# Patient Record
Sex: MALE | URBAN - METROPOLITAN AREA
[De-identification: names, ages, dates, MRNs, and addresses within clinical notes are randomized per-mention and may not be internally consistent; named-entity substitution may affect disease eponyms.]

---

## 2023-12-28 ENCOUNTER — APPOINTMENT (OUTPATIENT)
Dept: MEDICAL GROUP | Facility: MEDICAL CENTER | Age: 53
End: 2023-12-28
Payer: COMMERCIAL

## 2024-08-20 ENCOUNTER — HOSPITAL ENCOUNTER (EMERGENCY)
Facility: MEDICAL CENTER | Age: 54
End: 2024-08-20
Attending: EMERGENCY MEDICINE
Payer: COMMERCIAL

## 2024-08-20 ENCOUNTER — APPOINTMENT (OUTPATIENT)
Dept: RADIOLOGY | Facility: MEDICAL CENTER | Age: 54
End: 2024-08-20
Attending: EMERGENCY MEDICINE
Payer: COMMERCIAL

## 2024-08-20 VITALS
SYSTOLIC BLOOD PRESSURE: 146 MMHG | DIASTOLIC BLOOD PRESSURE: 86 MMHG | HEART RATE: 50 BPM | OXYGEN SATURATION: 97 % | TEMPERATURE: 98.2 F | HEIGHT: 72 IN | BODY MASS INDEX: 24.38 KG/M2 | RESPIRATION RATE: 17 BRPM | WEIGHT: 180 LBS

## 2024-08-20 DIAGNOSIS — R00.2 PALPITATIONS: ICD-10-CM

## 2024-08-20 LAB
ALBUMIN SERPL BCP-MCNC: 4 G/DL (ref 3.2–4.9)
ALBUMIN/GLOB SERPL: 1.7 G/DL
ALP SERPL-CCNC: 71 U/L (ref 30–99)
ALT SERPL-CCNC: 15 U/L (ref 2–50)
ANION GAP SERPL CALC-SCNC: 12 MMOL/L (ref 7–16)
AST SERPL-CCNC: 14 U/L (ref 12–45)
BASOPHILS # BLD AUTO: 1.3 % (ref 0–1.8)
BASOPHILS # BLD: 0.07 K/UL (ref 0–0.12)
BILIRUB SERPL-MCNC: 0.6 MG/DL (ref 0.1–1.5)
BUN SERPL-MCNC: 14 MG/DL (ref 8–22)
CALCIUM ALBUM COR SERPL-MCNC: 9.1 MG/DL (ref 8.5–10.5)
CALCIUM SERPL-MCNC: 9.1 MG/DL (ref 8.5–10.5)
CHLORIDE SERPL-SCNC: 106 MMOL/L (ref 96–112)
CO2 SERPL-SCNC: 22 MMOL/L (ref 20–33)
CREAT SERPL-MCNC: 0.71 MG/DL (ref 0.5–1.4)
EKG IMPRESSION: NORMAL
EOSINOPHIL # BLD AUTO: 0.04 K/UL (ref 0–0.51)
EOSINOPHIL NFR BLD: 0.8 % (ref 0–6.9)
ERYTHROCYTE [DISTWIDTH] IN BLOOD BY AUTOMATED COUNT: 41.8 FL (ref 35.9–50)
GFR SERPLBLD CREATININE-BSD FMLA CKD-EPI: 109 ML/MIN/1.73 M 2
GLOBULIN SER CALC-MCNC: 2.4 G/DL (ref 1.9–3.5)
GLUCOSE SERPL-MCNC: 103 MG/DL (ref 65–99)
HCT VFR BLD AUTO: 44.4 % (ref 42–52)
HGB BLD-MCNC: 15.1 G/DL (ref 14–18)
IMM GRANULOCYTES # BLD AUTO: 0.01 K/UL (ref 0–0.11)
IMM GRANULOCYTES NFR BLD AUTO: 0.2 % (ref 0–0.9)
LYMPHOCYTES # BLD AUTO: 0.99 K/UL (ref 1–4.8)
LYMPHOCYTES NFR BLD: 19.1 % (ref 22–41)
MCH RBC QN AUTO: 29.8 PG (ref 27–33)
MCHC RBC AUTO-ENTMCNC: 34 G/DL (ref 32.3–36.5)
MCV RBC AUTO: 87.7 FL (ref 81.4–97.8)
MONOCYTES # BLD AUTO: 0.35 K/UL (ref 0–0.85)
MONOCYTES NFR BLD AUTO: 6.7 % (ref 0–13.4)
NEUTROPHILS # BLD AUTO: 3.73 K/UL (ref 1.82–7.42)
NEUTROPHILS NFR BLD: 71.9 % (ref 44–72)
NRBC # BLD AUTO: 0 K/UL
NRBC BLD-RTO: 0 /100 WBC (ref 0–0.2)
PLATELET # BLD AUTO: 260 K/UL (ref 164–446)
PMV BLD AUTO: 9.6 FL (ref 9–12.9)
POTASSIUM SERPL-SCNC: 4 MMOL/L (ref 3.6–5.5)
PROT SERPL-MCNC: 6.4 G/DL (ref 6–8.2)
RBC # BLD AUTO: 5.06 M/UL (ref 4.7–6.1)
SODIUM SERPL-SCNC: 140 MMOL/L (ref 135–145)
TROPONIN T SERPL-MCNC: <6 NG/L (ref 6–19)
TSH SERPL DL<=0.005 MIU/L-ACNC: 0.66 UIU/ML (ref 0.38–5.33)
WBC # BLD AUTO: 5.2 K/UL (ref 4.8–10.8)

## 2024-08-20 PROCEDURE — 84443 ASSAY THYROID STIM HORMONE: CPT

## 2024-08-20 PROCEDURE — 36415 COLL VENOUS BLD VENIPUNCTURE: CPT

## 2024-08-20 PROCEDURE — 85025 COMPLETE CBC W/AUTO DIFF WBC: CPT

## 2024-08-20 PROCEDURE — 93005 ELECTROCARDIOGRAM TRACING: CPT

## 2024-08-20 PROCEDURE — 71045 X-RAY EXAM CHEST 1 VIEW: CPT

## 2024-08-20 PROCEDURE — 93005 ELECTROCARDIOGRAM TRACING: CPT | Performed by: EMERGENCY MEDICINE

## 2024-08-20 PROCEDURE — 80053 COMPREHEN METABOLIC PANEL: CPT

## 2024-08-20 PROCEDURE — 99283 EMERGENCY DEPT VISIT LOW MDM: CPT

## 2024-08-20 PROCEDURE — 84484 ASSAY OF TROPONIN QUANT: CPT

## 2024-08-20 ASSESSMENT — LIFESTYLE VARIABLES
TOTAL SCORE: 0
HAVE PEOPLE ANNOYED YOU BY CRITICIZING YOUR DRINKING: NO
HAVE YOU EVER FELT YOU SHOULD CUT DOWN ON YOUR DRINKING: NO
TOTAL SCORE: 0
EVER HAD A DRINK FIRST THING IN THE MORNING TO STEADY YOUR NERVES TO GET RID OF A HANGOVER: NO
TOTAL SCORE: 0
CONSUMPTION TOTAL: INCOMPLETE
DO YOU DRINK ALCOHOL: NO
EVER FELT BAD OR GUILTY ABOUT YOUR DRINKING: NO

## 2024-08-20 NOTE — Clinical Note
Osmani Allen was seen and treated in our emergency department on 8/20/2024.  He may return to work on 08/22/2024.       If you have any questions or concerns, please don't hesitate to call.      Everton Stanford D.O.

## 2024-08-20 NOTE — ED TRIAGE NOTES
".  Chief Complaint   Patient presents with    Palpitations     Pt. States he has been getting woke up with his \"heart skipping a beat\" that causes him to become sob, denies n/v or pain     ./76   Pulse 75   Temp 36.8 °C (98.2 °F) (Temporal)   Resp 18   Ht 1.829 m (6')   Wt 81.6 kg (180 lb)   SpO2 97%   BMI 24.41 kg/m²       "

## 2024-08-21 NOTE — ED PROVIDER NOTES
"ED Provider Note    CHIEF COMPLAINT  Chief Complaint   Patient presents with    Palpitations     Pt. States he has been getting woke up with his \"heart skipping a beat\" that causes him to become sob, denies n/v or pain       EXTERNAL RECORDS REVIEWED  Outpatient Notes   lifelong medical care, primary care services, urgent care in [place]    HPI/ROS  LIMITATION TO HISTORY   Select: : None  OUTSIDE HISTORIAN(S):  None    Osmani Allen is a 53 y.o. male who presents here for evaluation of heart palpitations.  The patient states that he has been waking up in the mornings with heart palpitations, but has no chest pain or shortness of breath.  He states it could be from his dreams, but he is unsure.  He has no pressure or pain, no headache, and no back pain.  He does not have any crease use of alcohol or caffeine.  He states he has been trying to meditate through his issues.    PAST MEDICAL HISTORY   No bleeding disorders    SURGICAL HISTORY  patient denies any surgical history    FAMILY HISTORY  No family history on file.    SOCIAL HISTORY  Social History     Tobacco Use    Smoking status: Not on file    Smokeless tobacco: Not on file   Substance and Sexual Activity    Alcohol use: Not on file    Drug use: Not on file    Sexual activity: Not on file       CURRENT MEDICATIONS  Home Medications       Reviewed by Nohelia Martinez R.N. (Registered Nurse) on 08/20/24 at 1320  Med List Status: Partial     Medication Last Dose Status        Patient Joe Taking any Medications                           ALLERGIES  No Known Allergies    PHYSICAL EXAM  VITAL SIGNS: BP (!) 146/86   Pulse (!) 50   Temp 36.8 °C (98.2 °F) (Temporal)   Resp 17   Ht 1.829 m (6')   Wt 81.6 kg (180 lb)   SpO2 97%   BMI 24.41 kg/m²    Constitutional: Well developed, well nourished. No acute distress.  HEENT: Normocephalic, atraumatic. Posterior pharynx clear and moist.  Eyes:  EOMI. Normal sclera.  Neck: Supple, Full range of motion, " nontender.  Chest/Pulmonary: clear to ausculation. Symmetrical expansion.   Cardio: Regular rate and rhythm with no murmur.   Abdomen: Soft, nontender. No peritoneal signs. No guarding. No palpable masses.  Musculoskeletal: No deformity, no edema, neurovascular intact.   Neuro: Clear speech, appropriate, cooperative, cranial nerves II-XII grossly intact.  Psych: Normal mood and affect      EKG/LABS  Results for orders placed or performed during the hospital encounter of 08/20/24   CBC with Differential   Result Value Ref Range    WBC 5.2 4.8 - 10.8 K/uL    RBC 5.06 4.70 - 6.10 M/uL    Hemoglobin 15.1 14.0 - 18.0 g/dL    Hematocrit 44.4 42.0 - 52.0 %    MCV 87.7 81.4 - 97.8 fL    MCH 29.8 27.0 - 33.0 pg    MCHC 34.0 32.3 - 36.5 g/dL    RDW 41.8 35.9 - 50.0 fL    Platelet Count 260 164 - 446 K/uL    MPV 9.6 9.0 - 12.9 fL    Neutrophils-Polys 71.90 44.00 - 72.00 %    Lymphocytes 19.10 (L) 22.00 - 41.00 %    Monocytes 6.70 0.00 - 13.40 %    Eosinophils 0.80 0.00 - 6.90 %    Basophils 1.30 0.00 - 1.80 %    Immature Granulocytes 0.20 0.00 - 0.90 %    Nucleated RBC 0.00 0.00 - 0.20 /100 WBC    Neutrophils (Absolute) 3.73 1.82 - 7.42 K/uL    Lymphs (Absolute) 0.99 (L) 1.00 - 4.80 K/uL    Monos (Absolute) 0.35 0.00 - 0.85 K/uL    Eos (Absolute) 0.04 0.00 - 0.51 K/uL    Baso (Absolute) 0.07 0.00 - 0.12 K/uL    Immature Granulocytes (abs) 0.01 0.00 - 0.11 K/uL    NRBC (Absolute) 0.00 K/uL   Complete Metabolic Panel (CMP)   Result Value Ref Range    Sodium 140 135 - 145 mmol/L    Potassium 4.0 3.6 - 5.5 mmol/L    Chloride 106 96 - 112 mmol/L    Co2 22 20 - 33 mmol/L    Anion Gap 12.0 7.0 - 16.0    Glucose 103 (H) 65 - 99 mg/dL    Bun 14 8 - 22 mg/dL    Creatinine 0.71 0.50 - 1.40 mg/dL    Calcium 9.1 8.5 - 10.5 mg/dL    Correct Calcium 9.1 8.5 - 10.5 mg/dL    AST(SGOT) 14 12 - 45 U/L    ALT(SGPT) 15 2 - 50 U/L    Alkaline Phosphatase 71 30 - 99 U/L    Total Bilirubin 0.6 0.1 - 1.5 mg/dL    Albumin 4.0 3.2 - 4.9 g/dL    Total  Protein 6.4 6.0 - 8.2 g/dL    Globulin 2.4 1.9 - 3.5 g/dL    A-G Ratio 1.7 g/dL   Troponins NOW   Result Value Ref Range    Troponin T <6 6 - 19 ng/L   TSH WITH REFLEX TO FT4   Result Value Ref Range    TSH 0.659 0.380 - 5.330 uIU/mL   ESTIMATED GFR   Result Value Ref Range    GFR (CKD-EPI) 109 >60 mL/min/1.73 m 2   EKG   Result Value Ref Range    Report       [facility]    Test Date:  2024  Pt Name:    NATHAN BUENO                  Department: ER  MRN:        [id number]                      Room:  Gender:     Male                         Technician: [id number]  :        1970                   Requested By:ER TRIAGE PROTOCOL  Order #:    [id number]                    Reading MD:    Measurements  Intervals                                Axis  Rate:       60                           P:          22  AL:         167                          QRS:        -4  QRSD:       95                           T:          6  QT:         399  QTc:        399    Interpretive Statements  Sinus rhythm  Probable left ventricular hypertrophy  No previous ECG available for comparison       EKG; normal sinus rhythm at a rate of 60.  No ST elevation, no ST depression.  QTc is 399.  No previous EKG available.    RADIOLOGY/PROCEDURES   I have independently interpreted the diagnostic imaging associated with this visit and am waiting the final reading from the radiologist.   My preliminary interpretation is as follows: Below    Radiologist interpretation:  DX-CHEST-PORTABLE (1 VIEW)   Final Result      No acute cardiopulmonary disease evident.          COURSE & MEDICAL DECISION MAKING    ASSESSMENT, COURSE AND PLAN  Care Narrative: This is a 53-year-old male here for evaluation of heart palpitations.  Patient has negative cardiac workup here, including thyroid studies.  He is comfortable, resting on the gurney, and has heart rate in the 50s and 60s.  He has no lightheadedness or dizziness.  He will  follow-up as outpatient, or return for any further issues or concerns.        DISPOSITION AND DISCUSSIONS  I have discussed management of the patient with the following physicians and KRIS's: None    Discussion of management with other QHP or appropriate source(s): None none    Escalation of care considered, and ultimately not performed: No IV fluids indicated    Barriers to care at this time, including but not limited to: Patient does not have established PCP.     Decision tools and prescription drugs considered including, but not limited to: None.    FINAL DIAGNOSIS  1. Palpitations         Electronically signed by: Everton Stanford D.O., 8/20/2024 5:19 PM

## 2024-10-06 ENCOUNTER — HOSPITAL ENCOUNTER (EMERGENCY)
Facility: MEDICAL CENTER | Age: 54
End: 2024-10-06
Attending: EMERGENCY MEDICINE

## 2024-10-06 VITALS
RESPIRATION RATE: 16 BRPM | OXYGEN SATURATION: 96 % | BODY MASS INDEX: 25.68 KG/M2 | DIASTOLIC BLOOD PRESSURE: 83 MMHG | WEIGHT: 189.6 LBS | HEART RATE: 87 BPM | TEMPERATURE: 98.3 F | HEIGHT: 72 IN | SYSTOLIC BLOOD PRESSURE: 131 MMHG

## 2024-10-06 DIAGNOSIS — G47.00 INSOMNIA, UNSPECIFIED TYPE: ICD-10-CM

## 2024-10-06 DIAGNOSIS — Z59.71 DOES NOT HAVE HEALTH INSURANCE: ICD-10-CM

## 2024-10-06 DIAGNOSIS — Z76.0 MEDICATION REFILL: ICD-10-CM

## 2024-10-06 PROCEDURE — 99282 EMERGENCY DEPT VISIT SF MDM: CPT

## 2024-10-06 RX ORDER — QUETIAPINE FUMARATE 25 MG/1
25 TABLET, FILM COATED ORAL
Status: SHIPPED | COMMUNITY
End: 2024-10-06

## 2024-10-06 RX ORDER — TRAZODONE HYDROCHLORIDE 100 MG/1
100 TABLET ORAL NIGHTLY
Qty: 60 TABLET | Refills: 0 | Status: SHIPPED | OUTPATIENT
Start: 2024-10-06 | End: 2024-12-05

## 2024-10-06 RX ORDER — QUETIAPINE FUMARATE 25 MG/1
25 TABLET, FILM COATED ORAL
Qty: 60 TABLET | Refills: 0 | Status: SHIPPED | OUTPATIENT
Start: 2024-10-06 | End: 2024-12-05

## 2024-10-06 RX ORDER — TRAZODONE HYDROCHLORIDE 100 MG/1
100 TABLET ORAL NIGHTLY
Status: SHIPPED | COMMUNITY
End: 2024-10-06

## 2024-10-06 ASSESSMENT — FIBROSIS 4 INDEX: FIB4 SCORE: 0.75

## 2025-01-22 ENCOUNTER — HOSPITAL ENCOUNTER (EMERGENCY)
Facility: MEDICAL CENTER | Age: 55
End: 2025-01-22
Attending: EMERGENCY MEDICINE

## 2025-01-22 VITALS
DIASTOLIC BLOOD PRESSURE: 98 MMHG | BODY MASS INDEX: 27 KG/M2 | SYSTOLIC BLOOD PRESSURE: 150 MMHG | TEMPERATURE: 97.8 F | OXYGEN SATURATION: 96 % | HEART RATE: 84 BPM | RESPIRATION RATE: 16 BRPM | WEIGHT: 199.08 LBS

## 2025-01-22 DIAGNOSIS — Z87.898 HISTORY OF INSOMNIA: ICD-10-CM

## 2025-01-22 DIAGNOSIS — Z76.0 MEDICATION REFILL: ICD-10-CM

## 2025-01-22 PROCEDURE — 99282 EMERGENCY DEPT VISIT SF MDM: CPT

## 2025-01-22 RX ORDER — QUETIAPINE FUMARATE 25 MG/1
25 TABLET, FILM COATED ORAL DAILY
COMMUNITY

## 2025-01-22 RX ORDER — QUETIAPINE FUMARATE 25 MG/1
25 TABLET, FILM COATED ORAL NIGHTLY
Qty: 30 TABLET | Refills: 0 | Status: SHIPPED | OUTPATIENT
Start: 2025-01-22

## 2025-01-22 RX ORDER — TRAZODONE HYDROCHLORIDE 100 MG/1
100 TABLET ORAL NIGHTLY
Qty: 30 TABLET | Refills: 0 | Status: SHIPPED | OUTPATIENT
Start: 2025-01-22

## 2025-01-22 RX ORDER — TRAZODONE HYDROCHLORIDE 100 MG/1
100 TABLET ORAL NIGHTLY
COMMUNITY

## 2025-01-22 ASSESSMENT — FIBROSIS 4 INDEX: FIB4 SCORE: 0.77

## 2025-01-22 ASSESSMENT — PAIN DESCRIPTION - PAIN TYPE: TYPE: ACUTE PAIN

## 2025-01-22 NOTE — ED NOTES
Patient ambulated to MAYI 20 without incident. Primary assessment complete. Patient oriented to care area. Chart up for ERP.

## 2025-01-23 NOTE — ED NOTES
Discharge instructions given to pt including follow up with [facility] or returning if no improvement of symptoms or to return if worse. Prescription x 2 provided to pt. Questions answered by RN. Denies any new complaints. Discharged w/stable vitals and able to ambulate to the lobby with steady gait.

## 2025-01-23 NOTE — ED PROVIDER NOTES
ED Provider Note    CHIEF COMPLAINT  Chief Complaint   Patient presents with    Medication Refill              EXTERNAL RECORDS REVIEWED  Care everywhere [facility] 11/16/2024 for medication refill, Seroquel.  This indicates history of paranoia.  Takes nightly normally 25 mg, but has been taking up to 100 mg during stressful times).  11/5/2024 [facility] for lead evaluation for palpitations.  Negative workup, normal EKG.  Trazodone and Seroquel for 10 years for paranoia and insomnia.,  Read online that this may cause arrhythmia and had suddenly stopped using these.  Encouraged to establish primary care, no hospitalization necessary, could pursue outpatient Holter or Zio patch.  Overall low risk.  No history or workup evidence for A-fib, SVT, V. tach or other severe arrhythmia.  Quetiapine and trazodone refilled    HPI/ROS  LIMITATION TO HISTORY   Select: : None  OUTSIDE HISTORIAN(S):  None    Osmani Allen is a 54 y.o. male who presents to the emergency department through triage requesting medication refill.  Patient states he is taking quetiapine 25 mg nightly and trazodone 100 mg nightly for at least 10 years for history of insomnia.  He took his last Seroquel last night, but has been out of trazodone for a few weeks.  Previously saw Dr. Oniel Hyde locally, but is no longer insured and cannot get refills through him without a patient appointment which she cannot afford.  Denies hallucinations or paranoia.  Denies any other new concerns at this time.    PAST MEDICAL HISTORY   has a past medical history of Hypertension and Insomnia.    SURGICAL HISTORY  patient denies any surgical history    FAMILY HISTORY  No family history on file.    SOCIAL HISTORY  Social History     Tobacco Use    Smoking status: Never    Smokeless tobacco: Never   Vaping Use    Vaping status: Never Used   Substance and Sexual Activity    Alcohol use: Not Currently    Drug use: Never    Sexual activity: Not on file        CURRENT MEDICATIONS  Home Medications       Reviewed by Georgette Cooper R.N. (Registered Nurse) on 01/22/25 at 1451  Med List Status: Not Addressed     Medication Last Dose Status   QUEtiapine (SEROQUEL) 25 MG Tab  Active   traZODone (DESYREL) 100 MG Tab  Active                    ALLERGIES  Allergies   Allergen Reactions    Penicillins Rash     rash       PHYSICAL EXAM  VITAL SIGNS: /81   Pulse 90   Temp 36.6 °C (97.8 °F) (Temporal)   Resp 18   Wt 90.3 kg (199 lb 1.2 oz)   SpO2 96%   BMI 27.00 kg/m²    Pulse ox interpretation: I interpret this pulse ox as normal.  Constitutional: Alert in no apparent distress.  HENT: Normocephalic, atraumatic. Bilateral external ears normal, Nose normal  Eyes: Pupils are equal and reactive  Neck: Normal range of motion  Cardiovascular: Normal peripheral perfusion   Thorax & Lungs: Nonlabored respirations  Skin: Warm, Dry  Musculoskeletal: Good range of motion in all major joints. No major deformities noted.   Neurologic: Alert and orient x 3.  Speech clear and cohesive.  Ambulates independently  Psychiatric: Affect normal, Judgment normal, Mood normal.       COURSE & MEDICAL DECISION MAKING    ASSESSMENT, COURSE AND PLAN  Care Narrative:   ED requesting medication refill provided.  Long-term use of Seroquel, trazodone for insomnia (prior notes indicate history of paranoia as well, although patient denies such at this time).  Previously established care locally but is since lost his insurance and cannot afford cash pay to see his primary care physician, has been referred to [facility] and hopes to reestablish care in this setting.  1 month supply of medications have been provided.  He denies any new concerns at this time    ADDITIONAL PROBLEMS MANAGED  Insomnia    DISPOSITION AND DISCUSSIONS    Discussion of management with other Q or appropriate source(s): None     Escalation of care considered, and ultimately not performed: None    The patient is  stable for discharge home, anticipatory guidance provided, close follow-up is encouraged and strict return instructions have been discussed. Patient is agreeable to the disposition and plan.      FINAL DIAGNOSIS  1. Medication refill    2. History of insomnia         Electronically signed by: Landy Little D.O., 1/22/2025 4:01 PM

## 2025-01-23 NOTE — DISCHARGE INSTRUCTIONS
Follow-up with primary care for reevaluation or to establish care, medication management.  Community clinics are referenced above.    Trazodone and Seroquel have been refilled for 1 month as requested.    Return to the emergency department for altered mental status, medication reaction, vomiting or other new concerns.

## 2025-05-27 ENCOUNTER — HOSPITAL ENCOUNTER (EMERGENCY)
Facility: MEDICAL CENTER | Age: 55
End: 2025-05-27
Attending: EMERGENCY MEDICINE
Payer: COMMERCIAL

## 2025-05-27 ENCOUNTER — HOSPITAL ENCOUNTER (EMERGENCY)
Facility: MEDICAL CENTER | Age: 55
End: 2025-05-28
Payer: COMMERCIAL

## 2025-05-27 VITALS
OXYGEN SATURATION: 98 % | SYSTOLIC BLOOD PRESSURE: 110 MMHG | RESPIRATION RATE: 15 BRPM | TEMPERATURE: 98 F | WEIGHT: 194.89 LBS | BODY MASS INDEX: 26.4 KG/M2 | HEART RATE: 84 BPM | HEIGHT: 72 IN | DIASTOLIC BLOOD PRESSURE: 75 MMHG

## 2025-05-27 DIAGNOSIS — L03.113 CELLULITIS OF RIGHT UPPER EXTREMITY: Primary | ICD-10-CM

## 2025-05-27 PROCEDURE — 99282 EMERGENCY DEPT VISIT SF MDM: CPT

## 2025-05-27 RX ORDER — MUPIROCIN 2 %
OINTMENT (GRAM) TOPICAL
Qty: 22 G | Refills: 1 | Status: SHIPPED | OUTPATIENT
Start: 2025-05-27

## 2025-05-27 RX ORDER — LOSARTAN POTASSIUM 100 MG/1
TABLET ORAL
COMMUNITY

## 2025-05-27 RX ORDER — CEPHALEXIN 500 MG/1
500 CAPSULE ORAL 4 TIMES DAILY
Qty: 20 CAPSULE | Refills: 0 | Status: ACTIVE | OUTPATIENT
Start: 2025-05-27 | End: 2025-06-01

## 2025-05-27 RX ORDER — SULFAMETHOXAZOLE AND TRIMETHOPRIM 800; 160 MG/1; MG/1
1 TABLET ORAL 2 TIMES DAILY
Qty: 10 TABLET | Refills: 0 | Status: ACTIVE | OUTPATIENT
Start: 2025-05-27 | End: 2025-06-01

## 2025-05-27 ASSESSMENT — FIBROSIS 4 INDEX: FIB4 SCORE: 0.77

## 2025-05-27 NOTE — ED PROVIDER NOTES
ED Provider Note    CHIEF COMPLAINT  Chief Complaint   Patient presents with    Rash         HPI/ROS  LIMITATION TO HISTORY   Select: : None  OUTSIDE HISTORIAN(S):  None    Osmani Allen is a 54 y.o. male who presents with rash to right upper arm and chest wall below his right axilla, sparing the axilla.  Onset 3 days ago.  Patient has history of both psoriasis and eczema, states this is different.  Minimal pain, no itching, no oozing.  He denies pustules.  He has a history of MRSA but in the past has had pustules with this type of infection, stating he believes it to be something different.  He denies trauma to the area.  No fever or chills, no difficulty breathing.    PAST MEDICAL HISTORY   has a past medical history of Hypertension and Insomnia.    SURGICAL HISTORY  patient denies any surgical history    FAMILY HISTORY  History reviewed. No pertinent family history.    SOCIAL HISTORY  Social History     Tobacco Use    Smoking status: Never    Smokeless tobacco: Never   Vaping Use    Vaping status: Never Used   Substance and Sexual Activity    Alcohol use: Not Currently    Drug use: Never    Sexual activity: Not on file       CURRENT MEDICATIONS  Home Medications       Reviewed by Missy Greenberg R.N. (Registered Nurse) on 05/27/25 at 1453  Med List Status: Not Addressed     Medication Last Dose Status   losartan (COZAAR) 100 MG Tab  Active   QUEtiapine (SEROQUEL) 25 MG Tab  Active   QUEtiapine (SEROQUEL) 25 MG Tab  Active   traZODone (DESYREL) 100 MG Tab  Active   traZODone (DESYREL) 100 MG Tab  Active                    ALLERGIES  Allergies[1]    PHYSICAL EXAM  VITAL SIGNS: /69   Pulse 97   Temp 37.1 °C (98.7 °F) (Temporal)   Resp 18   Ht 1.829 m (6')   Wt 88.4 kg (194 lb 14.2 oz)   SpO2 95%   BMI 26.43 kg/m²    Neurologic: Sensation and strength intact  Psychiatric: Normal mood  Vascular: Normal capillary refill  Musculoskeletal: No shoulder tenderness, no arthralgia  Skin: Deep red rash  Problem: Burn, Thermal Major/Minor (Adult)  Goal: Signs and Symptoms of Listed Potential Problems Will be Absent or Manageable (Burn, Thermal Major/Minor)  Outcome: Ongoing (interventions implemented as appropriate)       adjacent to right axilla on both the upper arm as well as chest wall.  No pustules, no oozing, no foul odor.  Patient has eczematous rash to his ankles which he states is chronic.  Several psoriatic lesions to both legs which he states are chronic.  No palpable fluctuant mass            COURSE & MEDICAL DECISION MAKING    ASSESSMENT, COURSE AND PLAN  Care Narrative: Patient with a mildly painful rash to his right chest wall and arm, differential including cellulitis, contact dermatitis, friction trauma, fungal skin infection.  Given appearance of the lesion, he will be prescribed antibiotics for coverage.  I have recommended he follow-up with his primary care doctor for recheck since possible.  Patient is otherwise well-appearing, no new rash on any other areas of the body.  No recent bleeding or abnormal bruising.  He is breathing comfortably with stable vital signs, appropriate for outpatient treatment at this time        DISPOSITION AND DISCUSSIONS    Escalation of care considered, and ultimately not performed:Laboratory analysis      Decision tools and prescription drugs considered including, but not limited to: Pain Medications were not indicated, no significant discomfort.    FINAL DIAGNOSIS  1. Cellulitis of right upper extremity         Electronically signed by: Oniel Baca M.D., 5/27/2025 3:30 PM           [1]   Allergies  Allergen Reactions    Penicillins Rash     rash    Quinolones Rash

## 2025-05-27 NOTE — ED TRIAGE NOTES
Osmani Allen  54 y.o. male    Chief Complaint   Patient presents with    Rash     Pt arrives with red rash to R joon. Pt unsure how long it has been there. Pt has hx of eczema and psoriasis.     Vitals:    05/27/25 1445   BP: 104/69   Pulse: 97   Resp: 18   Temp: 37.1 °C (98.7 °F)   SpO2: 95%       Triage process explained to patient, apologized for wait time, and returned to lobby.  Pt informed to notify staff of any change in condition.

## 2025-05-27 NOTE — DISCHARGE INSTRUCTIONS
See your doctor for recheck if not better in 1 week, sooner if worse or return to the emergency department